# Patient Record
Sex: MALE | Race: WHITE | NOT HISPANIC OR LATINO | Employment: FULL TIME | URBAN - METROPOLITAN AREA
[De-identification: names, ages, dates, MRNs, and addresses within clinical notes are randomized per-mention and may not be internally consistent; named-entity substitution may affect disease eponyms.]

---

## 2022-10-22 ENCOUNTER — HOSPITAL ENCOUNTER (EMERGENCY)
Facility: CLINIC | Age: 42
Discharge: HOME | End: 2022-10-23
Attending: EMERGENCY MEDICINE | Admitting: EMERGENCY MEDICINE
Payer: COMMERCIAL

## 2022-10-22 DIAGNOSIS — R45.851 SUICIDAL IDEATION: ICD-10-CM

## 2022-10-22 DIAGNOSIS — F10.920 ALCOHOLIC INTOXICATION WITHOUT COMPLICATION (H): ICD-10-CM

## 2022-10-22 LAB
BASOPHILS # BLD AUTO: 0 10E3/UL (ref 0–0.2)
BASOPHILS NFR BLD AUTO: 1 %
EOSINOPHIL # BLD AUTO: 0.1 10E3/UL (ref 0–0.7)
EOSINOPHIL NFR BLD AUTO: 1 %
ERYTHROCYTE [DISTWIDTH] IN BLOOD BY AUTOMATED COUNT: 12.1 % (ref 10–15)
HCT VFR BLD AUTO: 47.3 % (ref 40–53)
HGB BLD-MCNC: 15.6 G/DL (ref 13.3–17.7)
IMM GRANULOCYTES # BLD: 0.1 10E3/UL
IMM GRANULOCYTES NFR BLD: 1 %
LYMPHOCYTES # BLD AUTO: 1.4 10E3/UL (ref 0.8–5.3)
LYMPHOCYTES NFR BLD AUTO: 22 %
MCH RBC QN AUTO: 29 PG (ref 26.5–33)
MCHC RBC AUTO-ENTMCNC: 33 G/DL (ref 31.5–36.5)
MCV RBC AUTO: 88 FL (ref 78–100)
MONOCYTES # BLD AUTO: 0.5 10E3/UL (ref 0–1.3)
MONOCYTES NFR BLD AUTO: 8 %
NEUTROPHILS # BLD AUTO: 4.3 10E3/UL (ref 1.6–8.3)
NEUTROPHILS NFR BLD AUTO: 67 %
NRBC # BLD AUTO: 0 10E3/UL
NRBC BLD AUTO-RTO: 0 /100
PLATELET # BLD AUTO: 208 10E3/UL (ref 150–450)
RBC # BLD AUTO: 5.38 10E6/UL (ref 4.4–5.9)
WBC # BLD AUTO: 6.4 10E3/UL (ref 4–11)

## 2022-10-22 PROCEDURE — 80143 DRUG ASSAY ACETAMINOPHEN: CPT | Performed by: EMERGENCY MEDICINE

## 2022-10-22 PROCEDURE — 250N000009 HC RX 250: Performed by: EMERGENCY MEDICINE

## 2022-10-22 PROCEDURE — C9803 HOPD COVID-19 SPEC COLLECT: HCPCS

## 2022-10-22 PROCEDURE — 80053 COMPREHEN METABOLIC PANEL: CPT | Performed by: EMERGENCY MEDICINE

## 2022-10-22 PROCEDURE — 99285 EMERGENCY DEPT VISIT HI MDM: CPT | Mod: 25

## 2022-10-22 PROCEDURE — 96365 THER/PROPH/DIAG IV INF INIT: CPT

## 2022-10-22 PROCEDURE — 258N000003 HC RX IP 258 OP 636: Performed by: EMERGENCY MEDICINE

## 2022-10-22 PROCEDURE — 80179 DRUG ASSAY SALICYLATE: CPT | Performed by: EMERGENCY MEDICINE

## 2022-10-22 PROCEDURE — U0003 INFECTIOUS AGENT DETECTION BY NUCLEIC ACID (DNA OR RNA); SEVERE ACUTE RESPIRATORY SYNDROME CORONAVIRUS 2 (SARS-COV-2) (CORONAVIRUS DISEASE [COVID-19]), AMPLIFIED PROBE TECHNIQUE, MAKING USE OF HIGH THROUGHPUT TECHNOLOGIES AS DESCRIBED BY CMS-2020-01-R: HCPCS | Performed by: EMERGENCY MEDICINE

## 2022-10-22 PROCEDURE — 85025 COMPLETE CBC W/AUTO DIFF WBC: CPT | Performed by: EMERGENCY MEDICINE

## 2022-10-22 PROCEDURE — 82077 ASSAY SPEC XCP UR&BREATH IA: CPT | Performed by: EMERGENCY MEDICINE

## 2022-10-22 PROCEDURE — 36415 COLL VENOUS BLD VENIPUNCTURE: CPT | Performed by: EMERGENCY MEDICINE

## 2022-10-22 PROCEDURE — 82040 ASSAY OF SERUM ALBUMIN: CPT | Performed by: EMERGENCY MEDICINE

## 2022-10-22 PROCEDURE — 250N000011 HC RX IP 250 OP 636: Performed by: EMERGENCY MEDICINE

## 2022-10-22 PROCEDURE — 82375 ASSAY CARBOXYHB QUANT: CPT | Performed by: EMERGENCY MEDICINE

## 2022-10-22 RX ORDER — LORAZEPAM 0.5 MG/1
0.5 TABLET ORAL EVERY 8 HOURS PRN
Status: DISCONTINUED | OUTPATIENT
Start: 2022-10-22 | End: 2022-10-23 | Stop reason: HOSPADM

## 2022-10-22 RX ADMIN — FOLIC ACID: 5 INJECTION, SOLUTION INTRAMUSCULAR; INTRAVENOUS; SUBCUTANEOUS at 23:56

## 2022-10-22 ASSESSMENT — ENCOUNTER SYMPTOMS
NERVOUS/ANXIOUS: 0
FEVER: 0
DYSPHORIC MOOD: 1
HALLUCINATIONS: 0

## 2022-10-22 ASSESSMENT — ACTIVITIES OF DAILY LIVING (ADL): ADLS_ACUITY_SCORE: 35

## 2022-10-23 VITALS
HEART RATE: 56 BPM | BODY MASS INDEX: 27.3 KG/M2 | OXYGEN SATURATION: 100 % | HEIGHT: 71 IN | SYSTOLIC BLOOD PRESSURE: 127 MMHG | RESPIRATION RATE: 14 BRPM | WEIGHT: 195 LBS | TEMPERATURE: 98.5 F | DIASTOLIC BLOOD PRESSURE: 81 MMHG

## 2022-10-23 LAB
ALBUMIN SERPL BCG-MCNC: 4.4 G/DL (ref 3.5–5.2)
ALP SERPL-CCNC: 47 U/L (ref 40–129)
ALT SERPL W P-5'-P-CCNC: 22 U/L (ref 10–50)
ANION GAP SERPL CALCULATED.3IONS-SCNC: 14 MMOL/L (ref 7–15)
APAP SERPL-MCNC: <5 UG/ML (ref 10–30)
AST SERPL W P-5'-P-CCNC: 27 U/L (ref 10–50)
BILIRUB SERPL-MCNC: 0.2 MG/DL
BUN SERPL-MCNC: 13.7 MG/DL (ref 6–20)
CALCIUM SERPL-MCNC: 8.7 MG/DL (ref 8.6–10)
CHLORIDE SERPL-SCNC: 106 MMOL/L (ref 98–107)
COHGB MFR BLD: 1.1 % (ref 0–2)
CREAT SERPL-MCNC: 0.81 MG/DL (ref 0.67–1.17)
DEPRECATED HCO3 PLAS-SCNC: 21 MMOL/L (ref 22–29)
ETHANOL SERPL-MCNC: 0.1 G/DL
GFR SERPL CREATININE-BSD FRML MDRD: >90 ML/MIN/1.73M2
GLUCOSE SERPL-MCNC: 87 MG/DL (ref 70–99)
HOLD SPECIMEN: NORMAL
POTASSIUM SERPL-SCNC: 3.8 MMOL/L (ref 3.4–5.3)
PROT SERPL-MCNC: 6.3 G/DL (ref 6.4–8.3)
SALICYLATES SERPL-MCNC: <0.5 MG/DL
SARS-COV-2 RNA RESP QL NAA+PROBE: NEGATIVE
SODIUM SERPL-SCNC: 141 MMOL/L (ref 136–145)

## 2022-10-23 PROCEDURE — 90791 PSYCH DIAGNOSTIC EVALUATION: CPT

## 2022-10-23 ASSESSMENT — ACTIVITIES OF DAILY LIVING (ADL)
ADLS_ACUITY_SCORE: 35

## 2022-10-23 NOTE — ED TRIAGE NOTES
"Pt presents via EMS for evaluation of EtOH and suicidal ideation. Pt has been drinking since around 1400. Has had 4-5 shots of whiskey and 3 beers over a 4-5 hours span. Lat drink around 1930. Is not a daily drinker. Pt usually only drinks a few times a month. Today, pt was hanging out with friends. Today. Pt and girlfriend drove home, pt started to feel overwhelmed. Pt then stayed in the car and left it running in the garage. Pt states \"he is unhappy at his job, isn't happy with life right now and doesn't have anything specific to say why he felt this way today\". Girlfriend then came in to garage and pt and her were going back and forth. She called 911. Pt drove off in to a cul-de-sac. Pt placed on [place] by PD. Brought to ED for crisis/SI evaluation as opposed to [facility].       "

## 2022-10-23 NOTE — ED NOTES
"Diagnostic Evaluation Consultation  Crisis Assessment    Patient was assessed: Margaret  Patient location: [facility] ED  Was a release of information signed: Yes. Providers included on the release: therapy and psychiatry      Referral Data and Chief Complaint  Juan is a 42 year old, who uses he/him pronouns, and presents to the ED via police. Patient is referred to the ED by family/friends. Patient is presenting to the ED for the following concerns: per ED note on 10/23/22, \"Pt presents via EMS for evaluation of EtOH and suicidal ideation. Pt has been drinking since around 1400. Has had 4-5 shots of whiskey and 3 beers over a 4-5 hours span. Lat drink around 1930. Is not a daily drinker. Pt usually only drinks a few times a month. Today, pt was hanging out with friends. Today. Pt and girlfriend drove home, pt started to feel overwhelmed. Pt then stayed in the car and left it running in the garage. Pt states \"he is unhappy at his job, isn't happy with life right now and doesn't have anything specific to say why he felt this way today\". Girlfriend then came in to garage and pt and her were going back and forth. She called 911. Pt drove off in to a cul-de-sac. Pt placed on [place] by PD. Brought to ED for crisis/SI evaluation as opposed to [facility]\".      Informed Consent and Assessment Methods     Patient is his own guardian. Writer met with patient and explained the crisis assessment process, including applicable information disclosures and limits to confidentiality, assessed understanding of the process, and obtained consent to proceed with the assessment. Patient was observed to be able to participate in the assessment as evidenced by alert and oriented X3. Assessment methods included conducting a formal interview with patient, review of medical records, collaboration with medical staff, and obtaining relevant collateral information from family and community providers when available..     Over the course of this crisis " "assessment provided reassurance, offered validation, engaged patient in problem solving and disposition planning, worked with patient on safety and aftercare planning, worked with patient on brief, therapeutic activity: review of distress tolerance skills and provided psychoeducation. Patient's response to interventions was patient was open to discussing current and historical mental health concerns, was open to conversation about further mental health supports and open to conversation about how to make these more successful than previous attempts.  Patient was able to demonstrate understanding of the distress tolerance skills that were reviewed and was open to  contacting collateral.     Summary of Patient Situation    Patient reports that he presents to the emergency department tonight due to \"I felt kind of overwhelmed and frustrated\".  Patient reports recently (the last couple of months) he has felt especially overwhelmed by work primarily as well as general life concerns.  He reports that he has a longstanding history of anxiety and depression concerns that have been present \"off-and-on\" for \"most of my adult life\".  Patient reports he typically manages these concerns by talking with a couple of close friends who he feels provide good support, exercising regularly, and distracting himself through the use of match 3 games on his phone.  He reports that in the last couple of months this is started to feel like not enough and his anxiety has been escalating.  Patient reports that he and his girlfriend were at a friend's house this evening and he \"drank too much\", drinking upwards of 7-8 alcoholic beverages in approximately a 5-hour time span which is far more than his usual alcohol amount.  He reports that he and his girlfriend got into a fight and that he made the decision to run the car in the garage with the door shut and sit in the garage.  He reports that while he did this his thought was generally " "\"when is she going to go out to the garage\" and expecting his girlfriend to come find him.  Patient denies that he was intending to end his life and reports that he was feeling \"overwhelmed\" and was not sure how to ask for help.  Patient reports he has never attempted suicide in the past.  He does report that he has had passive thoughts of not wanting to be alive when his anxiety is especially escalated, however has never actively wanted to die, intended to follow through with a plan, or created a plan.  Patient notes that his sleep has been \"off this week\" and that he had chalked this up to allergies making it harder to sleep well.  He reports that he feels \"everything makes me anxious and describes a variety of social phobia triggers including public speaking, being in groups of people, as well as anxiety triggers about performance at work, general anxieties about things such as finances and the economy, worries about his relationships, and overall feeling that he must be always vigilant to the possibility of something bad happening.  Patient reports that when he has attempted to treat these in the past with medication and therapy (over 10 years ago) he found that medications made him \"feel like a zombie\" and that he went off of them shortly thereafter.  He found therapy not terribly effective and described his initial attempts at therapy is primarily talking about his childhood and learning how to distract himself, which he did not find helpful for managing his anxiety overall.  Patient denies problematic alcohol use, though reports in the past he has at times used alcohol to \"self medicate\" his anxiety in order to relax.  He reports that his primary stressor at this time is his current job as a .  He reports he is had this job for the past year and has not found it a job that is a good fit for him and does not feel like his manager is very effective.  Patient has decided in the last month or 2 that " "he would likely have to look for new employment in order to be more satisfying his job, however this is quite stressful for him as he does not wish to go through the jobhunting process.    We spent time in our meeting today reviewing the triggers and warning signs that patient has been dealing with over the last several months, including his presentation to the emergency department tonight.  We spent time creating safety planning with he and his girlfriend (contacted for collateral) with girlfriend planning to ensure that patient attends his newly scheduled intake appointments for individual therapy and psychiatric check-in.   and patient spent time discussing different types of therapy with the recommendation to engage in cognitive behavioral therapy with a strong exposure focus, considering patient's overall symptoms, and patient was also willing to schedule a psychiatry appointment to discuss his previous history with medications and determine if there may be more appropriate medications that can help him manage his anxiety while avoiding the feeling like a \"zombie\".  Patient denies any current suicidal ideation, denies homicidal ideation, reports that he is glad that he is alive, though anxious that he is in the hospital as he has anxiety around needles and medical providers.    Brief Psychosocial History  Patient currently lives with his girlfriend.  Does not discuss his family of origin as a significant stressor or support at this time.  Finds his work as a  currently unfulfilling and does not feel that his manager is very effective and plans to consider job hunting in the near future which is a stressor for him.  Patient reports no significant financial concerns, however does worry about finances and the economy regularly.  Reports his primary hobbies are reading, riding his motorcycle, playing match 3 games on his phone, and daily exercise.  Patient reports his primary supports at this " "time are his friends.  He reports that his girlfriend tries to be supportive, however she \"does not understand anxiety\".  Does not currently feel that his chanel or any other identity factors are a stress or support at this time.    Significant Clinical History  Patient reports that he has struggled with depression and anxiety throughout his entire adult life.  He is uncertain what his formal diagnoses may have been, however was treated for anxiety through medication management and therapy approximately 10 years ago.  Denies any other formal mental health treatments.  Reports he has struggled with anxious intrusive anxiety throughout much of his life, as well as struggling with low mood \"off and on\".  No previous mental health hospitalizations, no commitments, no previous treatment.  Does not discuss any relevant trauma history in our assessment today.     Collateral Information  The following information was received from Jasmin whose relationship to the patient is girlfriengifty. Information was obtained via phone. Their phone number is [phone] and they last had contact with patient on 10/22/22.    What happened today: Patient's girlfriend reports that they were at a friend's house and patient had a number of alcoholic beverages.  She reports that he got a \"scratch on his car\" and that he began to catastrophize about this on the drive home and was \"thrashing around in the car\".  Patient's girlfriend describes his attitude as \"spiraling\".  She reports that he made a number of statements about having \"nothing to live for\" and being the \"worst person in the world\" on the drive home.  When they got home, patient's girlfriend went upstairs and she heard the garage door open and shut.  She did not initially think anything of this as patient has, per her report, a significant and regular routine of checking the garage door at night before he can go to bed.  When she heard the car start she did come downstairs and went " "into the garage where she noticed patient was sitting in the garage with the car running.  She reports she told him \"you are not going to do this\" at which point patient did get into the vehicle and left the premises.  Patient's girlfriend contacted the police at that time and patient was found down the street in a neighborhood cul-de-sac in his car, parked.    What is different about patient's functioning: Patient's girlfriend reports \"he has always said he is not a happy person\" and that she has not noticed any recent behavior changes and that \"there was not anything different\" that would have led her to feel that he was struggling more.  She reports he has always been an anxious individual and often has significant routines that he needs to go through in order to calm his anxieties, such as checking the garage door regularly before bed.  She reports that he frequently becomes anxious if he does not know what plans are ahead of time and will regularly and repeatedly check in with her on \"what is the plan\" if he is not sure what it is and that he often \"needs to control things\" in order to manage his anxiety.    Concern about alcohol/drug use: No    What do you think the patient needs: Patient's girlfriend feels that patient would benefit from engaging with outpatient mental health supports such as therapy or medication management.  She reports historically he has been dismissive of the sorts of support as he has not found them helpful in the past and she is concerned that he will not follow through with scheduling appointments himself.    Has patient made comments about wanting to kill themselves/others:  Yes Patient had comments this evening about not wanting to be alive.  She reports this is the first time she has heard him talk about this and that he was significantly intoxicated at the time.    If d/c is recommended, can they take part in safety/aftercare planning: Yes Patient's girlfriend plans to remain a " part of his life and would like to support him in attending any scheduled therapy or psychiatry appointments.    Other information: Patient's girlfriend reports that she is willing to pick him up from the emergency department, however would like a call to confirm if this is needed.     Risk Assessment  ESS-6  1.a. Over the past 2 weeks, have you had thoughts of killing yourself? Yes  1.b. Have you ever attempted to kill yourself and, if yes, when did this last happen? Yes last evening, sat in a garage with a running car expecting his girlfriend to come into the garage in order to indicate the need for help   2. Recent or current suicide plan? No   3. Recent or current intent to act on ideation? No  4. Lifetime psychiatric hospitalization? No  5. Pattern of excessive substance use? No  6. Current irritability, agitation, or aggression? No  Scoring note: BOTH 1a and 1b must be yes for it to score 1 point, if both are not yes it is zero. All others are 1 point per number. If all questions 1a/1b - 6 are no, risk is negligible. If one of 1a/1b is yes, then risk is mild. If either question 2 or 3, but not both, is yes, then risk is automatically moderate regardless of total score. If both 2 and 3 are yes, risk is automatically high regardless of total score.      Score: 1, moderate risk      Does the patient have access to lethal means? Yes, has access to a vehicle     Does the patient engage in non-suicidal self-injurious behavior (NSSI/SIB)? no     Does the patient have thoughts of harming others? No     Is the patient engaging in sexually inappropriate behavior?  no        Current Substance Abuse     Is there recent substance abuse? Patient reports typically having 1 glass of wine with dinner at night and perhaps 3-4 beers when out with friends on the weekend.  Reports tonight he had 3 beers and 4-5 shots of whiskey, which is far beyond his typical alcohol intake.  Denies any other substance use beyond 2 cups of coffee  per day.     Was a urine drug screen or blood alcohol level obtained: Yes ETOH .10       Mental Status Exam     Affect: Appropriate   Appearance: Disheveled    Attention Span/Concentration: Attentive  Eye Contact: Engaged   Fund of Knowledge: Appropriate    Language /Speech Content: Fluent   Language /Speech Volume: Normal    Language /Speech Rate/Productions: Articulate    Recent Memory: Intact   Remote Memory: Intact   Mood: Anxious    Orientation to Person: Yes    Orientation to Place: Yes   Orientation to Time of Day: Yes    Orientation to Date: Yes    Situation (Do they understand why they are here?): Yes    Psychomotor Behavior: Normal    Thought Content: Clear   Thought Form: Goal Directed and Intact      History of commitment: No      Medication    Psychotropic medications: No  Medication changes made in the last two weeks: No       Current Care Team    Primary Care Provider: No  Psychiatrist: No  Therapist: No  : No     CTSS or ARMHS: No  ACT Team: No  Other: No      Diagnosis    300.02 (F41.1) Generalized Anxiety Disorder   296.32 (F33.1) Major Depressive Disorder, Recurrent Episode, Moderate _ and With anxious distress        Clinical Summary and Substantiation of Recommendations    Patient is a 42-year-old male with a history of what seems to be major recurrent depression, moderate as well as generalized anxiety disorder.  Considering patient's girlfriend's description of significant routines and the need for order, it is possible there is underlying obsessive compulsive disorder that may be further exacerbating anxiety and I have placed this as a rule out diagnosis today.  Patient has ongoing chronic stressors of an unsatisfying job and the likely need to job hunt, which will certainly trigger patient with his underlying anxiety disorder and need for control to manage this, as well as a week of poor sleep related to allergy concerns that may have further de-escalated his ability to manage  "emotional responses.  This, combined with his increased alcohol use tonight, appears to have led to an acute exacerbation of his anxiety and depression.  Patient has historically been dismissive of seeking out mental health supports, however also recognized the need to receive additional help and was not best sure how to do this and in the impulsive intoxicated state, made the decision to gesture towards suicide, with the idea that his girlfriend would find him before any damage was done and that this would indicate to her the need for him to get additional support.  Patient acknowledges being overwhelmed.  He remains anxious in the emergency department due to an underlying anxiety of medical providers and needles, however is open and engaged in our assessment today as well as in creating his aftercare plan as outlined below.  Patient is denying any current suicidal ideation, intent, planning.  Reports that he is happy to be alive, though continues to struggle with significant anxiety.  He was open to conversation about engaging ongoing mental health supports such as therapy (focused on cognitive behavioral therapy with an exposure basis) as well as psychiatric appointment to discuss possible medications that would not have the same \"zombie\" affect of his previous attempts.  Patient's girlfriend is open to helping him attend to these intake appointments and these were scheduled prior to him leaving the emergency department in order to support continuity of care and appointments were also scheduled on a telehealth basis to help patient address his underlying anxieties about going to medical clinics.  Patient was also able to review distress tolerance skills with the  and her meeting today and to begin practice using some of these in order to deal with his anxiety about being in the hospital.  Disposition    Recommended disposition: Individual Therapy and Medication Management       Reviewed case and " "recommendations with attending provider. Attending Name: Dr. Navarro     Attending concurs with disposition: Yes       Patient concurs with disposition: Yes       Guardian concurs with disposition: NA      Final disposition: Individual therapy  and Medication management.       Outpatient Details (if applicable):   Aftercare plan and appointments placed in the AVS and provided to patient: Yes. Given to patient by ED staff    Was lethal means counseling provided as a part of aftercare planning? Yes      Assessment Details    Patient interview started at: 4:19am and completed at: 4:51am.     Total duration spent on the patient case in minutes: 1.75 hrs      CPT code(s) utilized: 38543 - Psychotherapy for Crisis - 60 (30-74*) min       Alyssia Marquez, LICSW, MSW, LICSW, LMHP  DEC - Triage & Transition Services  Callback: [phone]      Aftercare Plan  If I am feeling unsafe or I am in a crisis, I will:   Contact my established care providers   Call the National Suicide Prevention Lifeline: 988  Go to the nearest emergency room   Call 911     Warning signs that I or other people might notice when a crisis is developing for me: more rigid or anxious thoughts, thoughts about wanting to be alive, feeling stuck and overwhelmed like \"I don't know what to do\"    Things I am able to do on my own to cope or help me feel better: distractions skills (playing match 3 games), focus on the one next thing, exercise    Things that I am able to do with others to cope or help me better: talk with my friends     Things I can use or do for distraction: phone games, distress tolerance skills (see below)    Changes I can make to support my mental health and wellness: Start therapy (remember there are different kinds of therapy. Skills focused CBT and exposure work will likely be helpful for you.  If you find the new therapy (see below) is not a good fit, the psychologytoday.com website can be helpful for filtering for therapists that do CBT " and take your insurance). Talk with a psychiatric provider about my history with medications.     People in my life that I can ask for help: girlfriend, friends    Your [facility] has a mental health crisis team you can call 24/7: [facility]  [phone]    Other things that are important when I'm in crisis: I have friends and loved ones that want to help and that are worth being alive for.     Additional resources and information:     Therapy Appointment  Date: Wednesday, 10/26/2022  Time: 2:00 pm - 3:00 pm  Provider: Guillaume Rich  Supervised by: Lisa Donnelly PsyD, LP  Location: [facility], [address] [place] [address], [facility] [address] [address]  Phone: [phone]  Type: Teletherapy    Scheduling Instructions  This provider is supervised by Lisa Donnelly. For all appointments, an email address for the patient is required.  Patient Instructions  If the patient is under the age of 14, we ask that only the parent or legal guardian attend the first session. Please arrive 15 minutes prior to your first appointment, and bring your insurance card and photo identification. You will receive an email to check in and sign required forms ahead of your appointment. If you do not, please call [phone].    Psychiatry appt  Date: Thursday, 11/3/2022  Time: 9:30 am - 10:30 am  Provider: Patrick T Schoenecker  MS  CNP,PMHNP,RN  Location: [place] [address]  Phone: [phone]  Type: Telepsychiatry    Scheduling Instructions  Jc can work with Adults only (18 years and up) Medication management [place] resident only. We do not see patients with schizophrenia or psychosis. We do not offer Antabuse prescriptions . We will see patients who are in remission from substance use disorders. We require at least 4 weeks abstinence from Alcohol/substance usage before being seen. Email address & phone number. We do not manage treatment of patients who  are on commitments/court-ordered psychiatric care/meds.      DISTRESS TOLERANCE SKILLS  ACCEPTS Skill  Distress tolerance skills are intended to help distract us and get us through difficult emotional situations one moment at a time. A way to remember this skill is with the acronym: ACCEPTS.    Activities  Focus attention on a task you need to get done. Rent movies; watch TV. Clean a room in your house. Find an event to go to. Play computer games. Go walking. Exercise. Surf the Internet. Write e-mails. Play sports. Go out for a meal or eat a favorite food. Call or go out with a friend. Listen to your iPod; download music. Build something. Spend time with your children. Play cards. Read magazines, books, comics. Do crossword puzzles or [employer].    Contributions  Find volunteer work to do. Help a friend or family member. Surprise someone with something nice (a card, a favor, a hug). Give away things you don t need. Call or send an instant message encouraging someone or just to say hello. Make something nice for someone else. Do something thoughtful.    Comparisons  Compare how you are feeling now to a time when you felt different. Think about people coping the same as you or less well than you. Compare yourself to those less fortunate. Watch reality shows about others  troubles; read about disasters, others  suffering.    Emotions  Read emotional books or stories, old letters. Watch emotional TV shows; go to emotional movies. Listen to emotional music. (Be sure the event creates different emotions.) Ideas: Scary movies, joke books, comedies, funny records, Ten[employer]ism music, soothing music or music that fires you up, going to a store and reading funny greeting cards.    Pushing Away  Push the situation away by leaving it for a while. Leave the situation mentally. Build an imaginary wall between yourself and the situation. Block thoughts and images from your mind. Notice ruminating: [place]  No!  Refuse to think about the  "painful situations. Put the pain on a shelf. Box it up and put it away for a while. Deny the problem for the moment.    Thoughts  Count to 10; count colors in a painting or poster or out the window; count anything. Repeat words to a song in your mind. Work puzzles. Watch TV or read.    Sensations  Squeeze a rubber ball very hard. Listen to very loud music. Hold ice in your hand or mouth. Go out in the rain or snow. Take a hot or cold shower.      Crisis Lines  Crisis Text Line  Text [id number]  You will be connected with a trained live crisis counselor to provide support.    Por espanol, texto  SALVADOR a 454717 o texto a 442-AYUDAME en WhatsApp    The [employer] Project (LGBTQ Youth Crisis Line)  0.466.128.3939  text START to 126-633      eReceipts  Fast Tracker  Linking people to mental health and substance use disorder resources  fastLiBn.org     [place] Mental Health Warm Line  Peer to peer support  Monday thru Saturday, 12 pm to 10 pm  078.619.0728 or 8.011.403.6930  Text \"Support\" to 82601    National [place] on Mental Illness (INEZ)  021.127.6637 or 1.888.INEZ.HELPS      Mental Health Apps  My3  https://mySensorflare PCpp.org/    VirtualHopeBox  https://RedZone Robotics.org/apps/virtual-hope-box/      Additional Information  Today you were seen by a licensed mental health professional through Triage and Transition services, [facility] (P)  for a crisis assessment in the Emergency Department at [facility].  It is recommended that you follow up with your established providers (psychiatrist, mental health therapist, and/or primary care doctor - as relevant) as soon as possible. Coordinators from [facility] will be calling you in the next 24-48 hours to ensure that you have the resources you need.  You can also contact [facility] coordinators directly at [phone]. You may have been scheduled for or offered an appointment with a mental health provider. [facility] maintains an extensive network of " licensed behavioral health providers to connect patients with the services they need.  We do not charge providers a fee to participate in our referral network.  We match patients with providers based on a patient's specific needs, insurance coverage, and location.  Our first effort will be to refer you to a provider within your care system, and will utilize providers outside your care system as needed.

## 2022-10-23 NOTE — ED PROVIDER NOTES
"  History   Chief Complaint:  Alcohol Intoxication and Depression       HPI   Juan Mobley is a 42 year old male with history of anxiety presenting with concerns for alcohol intoxication and mental health evaluation.  Patient reports drinking heavily this afternoon into the evening.  He denies any daily alcohol use.  He reports being brought to the ED after PD arrived to his home after he was found in his garage with the car on.  He reports he was only in there for a few minutes but he does report wanting to end his life this evening.  He denies any prior suicide attempts.  He denies any coingestions or drug use.  He reports working for [employer] as a .  He lives alone.  He denies any recent new stressors or triggers this evening.  He reports he is always suffered from depression/anxiety though is currently not on medications.  He has followed with a counselor in the past though not currently.  He denies any firearms in the home.  No physical symptoms reported.    Review of Systems   Constitutional: Negative for fever.   Psychiatric/Behavioral: Positive for dysphoric mood and suicidal ideas. Negative for hallucinations. The patient is not nervous/anxious.    All other systems reviewed and are negative.      Allergies:  Penicillins     Medications:  Celexa  Propranolol     Past Medical History:     Anxiety      Past Surgical History:    [place] teeth extraction      Family History:    Breast cancer - Mother     Social History:  Admits to alcohol use; denies drug use  PCP: Sean Alberts     Physical Exam     Patient Vitals for the past 24 hrs:   BP Temp Temp src Pulse Resp SpO2 Height Weight   10/22/22 2242 131/80 98.5  F (36.9  C) Oral 67 16 95 % 1.803 m (5' 11\") 88.5 kg (195 lb)       Physical Exam  Nursing note and vitals reviewed.  Constitutional: Well nourished. Resting comfortably.   Eyes: Conjunctiva normal.  Pupils are equal, round, and reactive to light.   ENT: Nose normal. Mucous " membranes pink and moist.    Neck: Normal range of motion.  CVS: Normal rate, regular rhythm.  Normal heart sounds.    Pulmonary: Lungs clear to auscultation bilaterally. No wheezes/rales/rhonchi.  GI: Abdomen soft. Nontender, nondistended. No rigidity or guarding.    MSK: No calf tenderness or swelling.  Neuro: Alert. Follows simple commands.  Skin: Skin is warm and dry. No rash noted.   Psychiatric: Flat affect, admits to suicidal ideations. Denies hallucinations.       Emergency Department Course       Laboratory:  Labs Ordered and Resulted from Time of ED Arrival to Time of ED Departure   COMPREHENSIVE METABOLIC PANEL - Abnormal       Result Value    Sodium 141      Potassium 3.8      Chloride 106      Carbon Dioxide (CO2) 21 (*)     Anion Gap 14      Urea Nitrogen 13.7      Creatinine 0.81      Calcium 8.7      Glucose 87      Alkaline Phosphatase 47      AST 27      ALT 22      Protein Total 6.3 (*)     Albumin 4.4      Bilirubin Total 0.2      GFR Estimate >90     ETHYL ALCOHOL LEVEL - Abnormal    Alcohol ethyl 0.10 (*)    ACETAMINOPHEN LEVEL - Abnormal    Acetaminophen <5.0 (*)    CARBON MONOXIDE - Normal    Carbon Monoxide 1.1     SALICYLATE LEVEL - Normal    Salicylate <0.5     COVID-19 VIRUS (CORONAVIRUS) BY PCR - Normal    SARS CoV2 PCR Negative     CBC WITH PLATELETS AND DIFFERENTIAL    WBC Count 6.4      RBC Count 5.38      Hemoglobin 15.6      Hematocrit 47.3      MCV 88      MCH 29.0      MCHC 33.0      RDW 12.1      Platelet Count 208      % Neutrophils 67      % Lymphocytes 22      % Monocytes 8      % Eosinophils 1      % Basophils 1      % Immature Granulocytes 1      NRBCs per 100 WBC 0      Absolute Neutrophils 4.3      Absolute Lymphocytes 1.4      Absolute Monocytes 0.5      Absolute Eosinophils 0.1      Absolute Basophils 0.0      Absolute Immature Granulocytes 0.1      Absolute NRBCs 0.0              Emergency Department Course:  Mental Health Risk Assessment      PSS-3    Date and Time  Over the past 2 weeks have you felt down, depressed, or hopeless? Over the past 2 weeks have you had thoughts of killing yourself? Have you ever attempted to kill yourself? When did this last happen? User   10/22/22 2235 yes yes yes -- EO      C-SSRS ([place])    Date and Time Q1 Wished to be Dead (Past Month) Q2 Suicidal Thoughts (Past Month) Q3 Suicidal Thought Method Q4 Suicidal Intent without Specific Plan Q5 Suicide Intent with Specific Plan Q6 Suicide Behavior (Lifetime) Within the Past 3 Months? RETIRED: Level of Risk per Screen Screening Not Complete User   10/22/22 2235 yes yes yes no yes yes -- -- -- EO              Suicide assessment completed by mental health (D.E.C., LCSW, etc.)    Reviewed:  I reviewed nursing notes, vitals, past medical history and Care Everywhere    Assessments:   I obtained history and examined the patient as noted above.       Consults:  DEC      Disposition:  The patient was discharged to home.     Impression & Plan     Medical Decision Making:  Patient is a 41 yo male presenting for alcohol intoxication and mental health evaluation.  He is acutely intoxicated on arrival and was observed in the ED until clinically sober.  CO negative, tylenol/salicylate negative.  He was medically cleared and evaluated by DEC.  Patient denies any current suicidality or response to internal stimuli.  He attributes actions to intoxication.  Plan for outpatient therapy appointment.  He contracts for safety and girlfriend agreeable to plan of care as well.  Return precautions given.     Diagnosis:    ICD-10-CM    1. Alcoholic intoxication without complication (H)  F10.920       2. Suicidal ideation  R45.851           Discharge Medications:  New Prescriptions    No medications on file       Scribe Disclosure:  SANTY, Keith Malik, am serving as a scribe at 11:08 PM on 10/22/2022 to document services personally performed by Ronda Navarro DO based on my observations and the provider's statements to  me.             Ronda Navarro, DO  10/23/22 0524

## 2022-10-23 NOTE — DISCHARGE INSTRUCTIONS
"Aftercare Plan  If I am feeling unsafe or I am in a crisis, I will:   Contact my established care providers   Call the National Suicide Prevention Lifeline: 988  Go to the nearest emergency room   Call 911     Warning signs that I or other people might notice when a crisis is developing for me: more rigid or anxious thoughts, thoughts about wanting to be alive, feeling stuck and overwhelmed like \"I don't know what to do\"    Things I am able to do on my own to cope or help me feel better: distractions skills (playing match 3 games), focus on the one next thing, exercise    Things that I am able to do with others to cope or help me better: talk with my friends     Things I can use or do for distraction: phone games, distress tolerance skills (see below)    Changes I can make to support my mental health and wellness: Start therapy (remember there are different kinds of therapy. Skills focused CBT and exposure work will likely be helpful for you.  If you find the new therapy (see below) is not a good fit, the psychologytoday.com website can be helpful for filtering for therapists that do CBT and take your insurance). Talk with a psychiatric provider about my history with medications.     People in my life that I can ask for help: girlfriend, friends    Your [facility] has a mental health crisis team you can call 24/7: [facility]  [phone]    Other things that are important when I'm in crisis: I have friends and loved ones that want to help and that are worth being alive for.     Additional resources and information:     Therapy Appointment  Date: Wednesday, 10/26/2022  Time: 2:00 pm - 3:00 pm  Provider: Guillaume Rich  Supervised by: Lisa Donnelly PsyD, LP  Location: [facility], [address] [place] [address], [address] [address]  Phone: [phone]  Type: Teletherapy    Scheduling Instructions  This provider is supervised by Lisa Donnelly. For all appointments, an email address for " the patient is required.  Patient Instructions  If the patient is under the age of 14, we ask that only the parent or legal guardian attend the first session. Please arrive 15 minutes prior to your first appointment, and bring your insurance card and photo identification. You will receive an email to check in and sign required forms ahead of your appointment. If you do not, please call [phone].    Psychiatry appt  Date: Thursday, 11/3/2022  Time: 9:30 am - 10:30 am  Provider: Patrick T Schoenecker  MS  CNP,PMHNP,RN  Location: [place] [address]  Phone: [phone]  Type: Telepsychiatry    Scheduling Instructions  Jc can work with Adults only (18 years and up) Medication management [place] resident only. We do not see patients with schizophrenia or psychosis. We do not offer Antabuse prescriptions . We will see patients who are in remission from substance use disorders. We require at least 4 weeks abstinence from Alcohol/substance usage before being seen. Email address & phone number. We do not manage treatment of patients who are on commitments/court-ordered psychiatric care/meds.      DISTRESS TOLERANCE SKILLS  ACCEPTS Skill  Distress tolerance skills are intended to help distract us and get us through difficult emotional situations one moment at a time. A way to remember this skill is with the acronym: ACCEPTS.    Activities  Focus attention on a task you need to get done. Rent movies; watch TV. Clean a room in your house. Find an event to go to. Play computer games. Go walking. Exercise. Surf the Internet. Write e-mails. Play sports. Go out for a meal or eat a favorite food. Call or go out with a friend. Listen to your iPod; download music. Build something. Spend time with your children. Play cards. Read magazines, books, comics. Do crossword puzzles or Sudoku.    Contributions  Find volunteer work to do. Help a friend or family member. Surprise someone  with something nice (a card, a favor, a hug). Give away things you don t need. Call or send an instant message encouraging someone or just to say hello. Make something nice for someone else. Do something thoughtful.    Comparisons  Compare how you are feeling now to a time when you felt different. Think about people coping the same as you or less well than you. Compare yourself to those less fortunate. Watch reality shows about others  troubles; read about disasters, others  suffering.    Emotions  Read emotional books or stories, old letters. Watch emotional TV shows; go to emotional movies. Listen to emotional music. (Be sure the event creates different emotions.) Ideas: Scary movies, joke books, comedies, funny records, Baptist music, soothing music or music that fires you up, going to a store and reading funny greeting cards.    Pushing Away  Push the situation away by leaving it for a while. Leave the situation mentally. Build an imaginary wall between yourself and the situation. Block thoughts and images from your mind. Notice ruminating: Hinds  No!  Refuse to think about the painful situations. Put the pain on a shelf. Box it up and put it away for a while. Deny the problem for the moment.    Thoughts  Count to 10; count colors in a painting or poster or out the window; count anything. Repeat words to a song in your mind. Work puzzles. Watch TV or read.    Sensations  Squeeze a rubber ball very hard. Listen to very loud music. Hold ice in your hand or mouth. Go out in the rain or snow. Take a hot or cold shower.      Crisis Lines  Crisis Text Line  Text 436711  You will be connected with a trained live crisis counselor to provide support.    Por espanol, texto  SALVADOR a 663263 o texto a 442-AYUDAME en WhatsApp    The Raghu Project (LGBTQ Youth Crisis Line)  6.586.525.5095  text START to 099-576      Community Resources  Fast Tracker  Linking people to mental health and substance use disorder resources   "RMIckermn.NewsCastic     [place] Mental Health Warm Line  Peer to peer support  Monday thru Saturday, 12 pm to 10 pm  064.504.7596 or 8.580.515.7427  Text \"Support\" to 74046    National [place] on Mental Illness (INEZ)  257.488.5293 or 1.888.INEZ.HELPS      Mental Health Apps  My3  https://EchoSign.org/    VirtualHopeBox  https://Buz/apps/virtual-hope-box/      Additional Information  Today you were seen by a licensed mental health professional through Triage and Transition services, [facility] ([facility])  for a crisis assessment in the Emergency Department at [facility].  It is recommended that you follow up with your established providers (psychiatrist, mental health therapist, and/or primary care doctor - as relevant) as soon as possible. Coordinators from [facility] will be calling you in the next 24-48 hours to ensure that you have the resources you need.  You can also contact [facility] coordinators directly at [phone]. You may have been scheduled for or offered an appointment with a mental health provider. [facility] maintains an extensive network of licensed behavioral health providers to connect patients with the services they need.  We do not charge providers a fee to participate in our referral network.  We match patients with providers based on a patient's specific needs, insurance coverage, and location.  Our first effort will be to refer you to a provider within your care system, and will utilize providers outside your care system as needed.    "